# Patient Record
Sex: MALE | NOT HISPANIC OR LATINO | URBAN - METROPOLITAN AREA
[De-identification: names, ages, dates, MRNs, and addresses within clinical notes are randomized per-mention and may not be internally consistent; named-entity substitution may affect disease eponyms.]

---

## 2017-10-04 ENCOUNTER — OFFICE VISIT (OUTPATIENT)
Dept: RETAIL CLINIC | Facility: CLINIC | Age: 52
End: 2017-10-04

## 2017-10-04 DIAGNOSIS — Z02.83 ENCOUNTER FOR DRUG SCREENING: Primary | ICD-10-CM

## 2017-10-04 NOTE — PROGRESS NOTES
Subjective   Mayito Jiménez is a 52 y.o. male.     Patient presents to clinic for an E-Screen. See scanned documents.     Diagnosis for this visit:  Encounter for drug screening [Z02.83]    Notes: See scanned [facility].    Follow-up with employer results.